# Patient Record
Sex: MALE | Race: WHITE | NOT HISPANIC OR LATINO
[De-identification: names, ages, dates, MRNs, and addresses within clinical notes are randomized per-mention and may not be internally consistent; named-entity substitution may affect disease eponyms.]

---

## 2017-06-29 ENCOUNTER — APPOINTMENT (OUTPATIENT)
Dept: HEART AND VASCULAR | Facility: CLINIC | Age: 77
End: 2017-06-29

## 2017-06-29 VITALS
WEIGHT: 154 LBS | BODY MASS INDEX: 24.75 KG/M2 | SYSTOLIC BLOOD PRESSURE: 136 MMHG | HEIGHT: 66 IN | DIASTOLIC BLOOD PRESSURE: 62 MMHG | HEART RATE: 57 BPM

## 2017-06-30 ENCOUNTER — RESULT REVIEW (OUTPATIENT)
Age: 77
End: 2017-06-30

## 2017-06-30 LAB
ANION GAP SERPL CALC-SCNC: 20 MMOL/L
BUN SERPL-MCNC: 27 MG/DL
CALCIUM SERPL-MCNC: 9.6 MG/DL
CHLORIDE SERPL-SCNC: 105 MMOL/L
CHOLEST SERPL-MCNC: 164 MG/DL
CHOLEST/HDLC SERPL: 3 RATIO
CO2 SERPL-SCNC: 18 MMOL/L
CREAT SERPL-MCNC: 1.06 MG/DL
GLUCOSE SERPL-MCNC: 101 MG/DL
HDLC SERPL-MCNC: 55 MG/DL
LDLC SERPL CALC-MCNC: 90 MG/DL
POTASSIUM SERPL-SCNC: 4 MMOL/L
SODIUM SERPL-SCNC: 143 MMOL/L
TRIGL SERPL-MCNC: 97 MG/DL

## 2017-07-24 ENCOUNTER — APPOINTMENT (OUTPATIENT)
Dept: HEART AND VASCULAR | Facility: CLINIC | Age: 77
End: 2017-07-24

## 2017-07-24 VITALS
WEIGHT: 154 LBS | BODY MASS INDEX: 24.75 KG/M2 | RESPIRATION RATE: 18 BRPM | HEIGHT: 66 IN | SYSTOLIC BLOOD PRESSURE: 108 MMHG | HEART RATE: 60 BPM | DIASTOLIC BLOOD PRESSURE: 56 MMHG

## 2017-12-11 ENCOUNTER — APPOINTMENT (OUTPATIENT)
Dept: HEART AND VASCULAR | Facility: CLINIC | Age: 77
End: 2017-12-11
Payer: MEDICARE

## 2017-12-11 PROCEDURE — 94727 GAS DIL/WSHOT DETER LNG VOL: CPT

## 2017-12-11 PROCEDURE — 94729 DIFFUSING CAPACITY: CPT

## 2017-12-11 PROCEDURE — 94621 CARDIOPULM EXERCISE TESTING: CPT

## 2017-12-11 PROCEDURE — 94010 BREATHING CAPACITY TEST: CPT | Mod: 59

## 2017-12-13 ENCOUNTER — RESULT REVIEW (OUTPATIENT)
Age: 77
End: 2017-12-13

## 2017-12-13 ENCOUNTER — APPOINTMENT (OUTPATIENT)
Dept: HEART AND VASCULAR | Facility: CLINIC | Age: 77
End: 2017-12-13
Payer: MEDICARE

## 2017-12-13 VITALS
BODY MASS INDEX: 25.07 KG/M2 | WEIGHT: 156 LBS | SYSTOLIC BLOOD PRESSURE: 130 MMHG | HEART RATE: 56 BPM | DIASTOLIC BLOOD PRESSURE: 60 MMHG | HEIGHT: 66 IN

## 2017-12-13 PROCEDURE — 93320 DOPPLER ECHO COMPLETE: CPT

## 2017-12-13 PROCEDURE — 93325 DOPPLER ECHO COLOR FLOW MAPG: CPT

## 2017-12-13 PROCEDURE — 93351 STRESS TTE COMPLETE: CPT

## 2017-12-15 ENCOUNTER — RESULT REVIEW (OUTPATIENT)
Age: 77
End: 2017-12-15

## 2018-01-05 ENCOUNTER — APPOINTMENT (OUTPATIENT)
Dept: HEART AND VASCULAR | Facility: CLINIC | Age: 78
End: 2018-01-05
Payer: MEDICARE

## 2018-01-05 VITALS
DIASTOLIC BLOOD PRESSURE: 70 MMHG | RESPIRATION RATE: 18 BRPM | OXYGEN SATURATION: 97 % | HEIGHT: 66 IN | SYSTOLIC BLOOD PRESSURE: 120 MMHG | WEIGHT: 158 LBS | BODY MASS INDEX: 25.39 KG/M2 | HEART RATE: 62 BPM

## 2018-01-05 PROCEDURE — 99214 OFFICE O/P EST MOD 30 MIN: CPT | Mod: 25

## 2018-01-05 PROCEDURE — 36415 COLL VENOUS BLD VENIPUNCTURE: CPT

## 2018-01-05 RX ORDER — VALACYCLOVIR 500 MG/1
500 TABLET, FILM COATED ORAL
Qty: 30 | Refills: 0 | Status: ACTIVE | COMMUNITY
Start: 2017-08-23

## 2018-01-06 LAB
ALBUMIN SERPL ELPH-MCNC: 4.3 G/DL
ALP BLD-CCNC: 65 U/L
ALT SERPL-CCNC: 18 U/L
ANION GAP SERPL CALC-SCNC: 13 MMOL/L
AST SERPL-CCNC: 21 U/L
BILIRUB SERPL-MCNC: 0.4 MG/DL
BUN SERPL-MCNC: 24 MG/DL
CALCIUM SERPL-MCNC: 9.7 MG/DL
CHLORIDE SERPL-SCNC: 103 MMOL/L
CO2 SERPL-SCNC: 24 MMOL/L
CREAT SERPL-MCNC: 1.01 MG/DL
GLUCOSE SERPL-MCNC: 91 MG/DL
POTASSIUM SERPL-SCNC: 4.1 MMOL/L
PROT SERPL-MCNC: 6.2 G/DL
SODIUM SERPL-SCNC: 140 MMOL/L

## 2018-02-05 ENCOUNTER — APPOINTMENT (OUTPATIENT)
Dept: HEART AND VASCULAR | Facility: CLINIC | Age: 78
End: 2018-02-05
Payer: MEDICARE

## 2018-02-05 PROCEDURE — 99214 OFFICE O/P EST MOD 30 MIN: CPT

## 2018-02-05 RX ORDER — METOPROLOL SUCCINATE 50 MG/1
50 TABLET, EXTENDED RELEASE ORAL
Qty: 2 | Refills: 0 | Status: DISCONTINUED | COMMUNITY
Start: 2018-01-05 | End: 2018-02-05

## 2018-06-05 ENCOUNTER — LABORATORY RESULT (OUTPATIENT)
Age: 78
End: 2018-06-05

## 2018-06-05 ENCOUNTER — APPOINTMENT (OUTPATIENT)
Dept: HEART AND VASCULAR | Facility: CLINIC | Age: 78
End: 2018-06-05
Payer: MEDICARE

## 2018-06-05 VITALS
SYSTOLIC BLOOD PRESSURE: 108 MMHG | HEIGHT: 66 IN | WEIGHT: 155 LBS | BODY MASS INDEX: 24.91 KG/M2 | DIASTOLIC BLOOD PRESSURE: 62 MMHG | HEART RATE: 60 BPM | RESPIRATION RATE: 20 BRPM

## 2018-06-05 PROCEDURE — 36415 COLL VENOUS BLD VENIPUNCTURE: CPT

## 2018-06-05 PROCEDURE — 99214 OFFICE O/P EST MOD 30 MIN: CPT | Mod: 25

## 2018-06-05 RX ORDER — ROSUVASTATIN CALCIUM 20 MG/1
20 TABLET, FILM COATED ORAL
Qty: 90 | Refills: 0 | Status: ACTIVE | COMMUNITY
Start: 2017-12-12

## 2018-06-06 LAB
ALBUMIN SERPL ELPH-MCNC: 4.2 G/DL
ALP BLD-CCNC: 59 U/L
ALT SERPL-CCNC: 16 U/L
ANION GAP SERPL CALC-SCNC: 13 MMOL/L
APPEARANCE: CLEAR
AST SERPL-CCNC: 21 U/L
BASOPHILS # BLD AUTO: 0.02 K/UL
BASOPHILS NFR BLD AUTO: 0.3 %
BILIRUB SERPL-MCNC: 0.5 MG/DL
BILIRUBIN URINE: NEGATIVE
BLOOD URINE: NEGATIVE
BUN SERPL-MCNC: 27 MG/DL
CALCIUM SERPL-MCNC: 9.3 MG/DL
CHLORIDE SERPL-SCNC: 106 MMOL/L
CHOLEST SERPL-MCNC: 137 MG/DL
CHOLEST/HDLC SERPL: 3 RATIO
CO2 SERPL-SCNC: 23 MMOL/L
COLOR: YELLOW
CREAT SERPL-MCNC: 0.89 MG/DL
EOSINOPHIL # BLD AUTO: 0.1 K/UL
EOSINOPHIL NFR BLD AUTO: 1.3 %
GLUCOSE QUALITATIVE U: NEGATIVE MG/DL
GLUCOSE SERPL-MCNC: 91 MG/DL
HCT VFR BLD CALC: 44.7 %
HDLC SERPL-MCNC: 46 MG/DL
HGB BLD-MCNC: 14 G/DL
IMM GRANULOCYTES NFR BLD AUTO: 0.3 %
INR PPP: 0.98 RATIO
KETONES URINE: NEGATIVE
LDLC SERPL CALC-MCNC: 73 MG/DL
LEUKOCYTE ESTERASE URINE: NEGATIVE
LYMPHOCYTES # BLD AUTO: 1.19 K/UL
LYMPHOCYTES NFR BLD AUTO: 15.4 %
MAN DIFF?: NORMAL
MCHC RBC-ENTMCNC: 30.4 PG
MCHC RBC-ENTMCNC: 31.3 GM/DL
MCV RBC AUTO: 97 FL
MONOCYTES # BLD AUTO: 0.56 K/UL
MONOCYTES NFR BLD AUTO: 7.3 %
NEUTROPHILS # BLD AUTO: 5.82 K/UL
NEUTROPHILS NFR BLD AUTO: 75.4 %
NITRITE URINE: NEGATIVE
PH URINE: 5
PLATELET # BLD AUTO: 213 K/UL
POTASSIUM SERPL-SCNC: 4.6 MMOL/L
PROT SERPL-MCNC: 6.4 G/DL
PROTEIN URINE: ABNORMAL MG/DL
PT BLD: 11.1 SEC
RBC # BLD: 4.61 M/UL
RBC # FLD: 13.7 %
SODIUM SERPL-SCNC: 142 MMOL/L
SPECIFIC GRAVITY URINE: 1.02
TRIGL SERPL-MCNC: 90 MG/DL
UROBILINOGEN URINE: 1 MG/DL
WBC # FLD AUTO: 7.71 K/UL

## 2018-10-08 ENCOUNTER — APPOINTMENT (OUTPATIENT)
Dept: HEART AND VASCULAR | Facility: CLINIC | Age: 78
End: 2018-10-08
Payer: MEDICARE

## 2018-10-08 PROCEDURE — 36415 COLL VENOUS BLD VENIPUNCTURE: CPT

## 2018-10-09 LAB
ALBUMIN SERPL ELPH-MCNC: 4 G/DL
ALP BLD-CCNC: 57 U/L
ALT SERPL-CCNC: 17 U/L
ANION GAP SERPL CALC-SCNC: 16 MMOL/L
APPEARANCE: CLEAR
AST SERPL-CCNC: 21 U/L
BASOPHILS # BLD AUTO: 0.03 K/UL
BASOPHILS NFR BLD AUTO: 0.5 %
BILIRUB SERPL-MCNC: 0.6 MG/DL
BILIRUBIN URINE: NEGATIVE
BLOOD URINE: NEGATIVE
BUN SERPL-MCNC: 20 MG/DL
CALCIUM SERPL-MCNC: 9.3 MG/DL
CHLORIDE SERPL-SCNC: 108 MMOL/L
CHOLEST SERPL-MCNC: 145 MG/DL
CHOLEST/HDLC SERPL: 2.7 RATIO
CO2 SERPL-SCNC: 23 MMOL/L
COLOR: YELLOW
CREAT SERPL-MCNC: 0.94 MG/DL
EOSINOPHIL # BLD AUTO: 0.14 K/UL
EOSINOPHIL NFR BLD AUTO: 2.3 %
GLUCOSE QUALITATIVE U: NEGATIVE MG/DL
GLUCOSE SERPL-MCNC: 68 MG/DL
HCT VFR BLD CALC: 47.3 %
HDLC SERPL-MCNC: 53 MG/DL
HGB BLD-MCNC: 14.3 G/DL
IMM GRANULOCYTES NFR BLD AUTO: 0.3 %
INR PPP: 0.95 RATIO
KETONES URINE: NEGATIVE
LDLC SERPL CALC-MCNC: 73 MG/DL
LEUKOCYTE ESTERASE URINE: NEGATIVE
LYMPHOCYTES # BLD AUTO: 1.11 K/UL
LYMPHOCYTES NFR BLD AUTO: 18 %
MAN DIFF?: NORMAL
MCHC RBC-ENTMCNC: 30.1 PG
MCHC RBC-ENTMCNC: 30.2 GM/DL
MCV RBC AUTO: 99.6 FL
MONOCYTES # BLD AUTO: 0.41 K/UL
MONOCYTES NFR BLD AUTO: 6.6 %
NEUTROPHILS # BLD AUTO: 4.46 K/UL
NEUTROPHILS NFR BLD AUTO: 72.3 %
NITRITE URINE: NEGATIVE
PH URINE: 6
PLATELET # BLD AUTO: 202 K/UL
POTASSIUM SERPL-SCNC: 4 MMOL/L
PROT SERPL-MCNC: 6.4 G/DL
PROTEIN URINE: NEGATIVE MG/DL
PT BLD: 10.7 SEC
RBC # BLD: 4.75 M/UL
RBC # FLD: 14.1 %
SODIUM SERPL-SCNC: 147 MMOL/L
SPECIFIC GRAVITY URINE: 1.01
TRIGL SERPL-MCNC: 95 MG/DL
UROBILINOGEN URINE: NEGATIVE MG/DL
WBC # FLD AUTO: 6.17 K/UL

## 2018-12-20 ENCOUNTER — APPOINTMENT (OUTPATIENT)
Dept: HEART AND VASCULAR | Facility: CLINIC | Age: 78
End: 2018-12-20
Payer: MEDICARE

## 2018-12-20 VITALS
HEIGHT: 66 IN | WEIGHT: 156 LBS | BODY MASS INDEX: 25.07 KG/M2 | DIASTOLIC BLOOD PRESSURE: 72 MMHG | HEART RATE: 60 BPM | RESPIRATION RATE: 20 BRPM | SYSTOLIC BLOOD PRESSURE: 144 MMHG

## 2018-12-20 PROCEDURE — 99214 OFFICE O/P EST MOD 30 MIN: CPT

## 2018-12-20 RX ORDER — LEVOTHYROXINE SODIUM 0.3 MG/1
300 TABLET ORAL
Qty: 90 | Refills: 0 | Status: DISCONTINUED | COMMUNITY
Start: 2018-06-22

## 2018-12-20 RX ORDER — FUROSEMIDE 20 MG/1
20 TABLET ORAL
Qty: 30 | Refills: 0 | Status: DISCONTINUED | COMMUNITY
Start: 2018-10-15

## 2018-12-21 NOTE — DISCUSSION/SUMMARY
[Cardiomyopathy] : cardiomyopathy [Coronary Artery Disease] : coronary artery disease [Anginal Equivalent] : anginal equivalent [Possible Cardiac Ischemia (Intermd Prob)] : possible cardiac ischemia (intermediate probability) [Medication Changes Per Orders] : as documented in orders [Coronary Artery Catheterization] : coronary artery catheterization [Possible Stent Placement] : possible stent placement [Dietary Modification] : dietary modification [Hyperlipidemia] : hyperlipidemia [Diet Modification] : diet modification [Exercise] : exercise [Hypertension] : hypertension [Stable] : stable [Exercise Regimen] : an exercise regimen [Mild Mitral Regurgitation] : mild mitral regurgitation [Compensated] : compensated [None] : none [Sodium Restriction] : sodium restriction [Patient] : the patient [de-identified] : patent LAD stent; 50 LAD mid with 0.84 FFR [FreeTextEntry1] : AI/TR - stable

## 2018-12-21 NOTE — REASON FOR VISIT
[Follow-Up - Clinic] : a clinic follow-up of [Cardiomyopathy] : cardiomyopathy [Coronary Artery Disease] : coronary artery disease [Hyperlipidemia] : hyperlipidemia [Hypertension] : hypertension [Mitral Regurgitation] : mitral regurgitation [FreeTextEntry1] : AI/TR

## 2018-12-21 NOTE — HISTORY OF PRESENT ILLNESS
[FreeTextEntry1] : Dhaval Barriga returns for follow up.  He denies cp, sob at rest, pnd, orthopnea, edema, palp, or loc.\par \par He still feels like he wheezes when he exhales.  He is compliant with exercise and meds.\par \par Cardiac Cath 10/2018: ANSELMO CX/RCA; patent LAD stent; 50% mid LAD lesion with 0.84 FFR.\par \par Recovery has been unremarkable.  \par \par Results reviewed.  Will start L arginine and ranolazine 500 mg daily.\par \par Follow up in 4 weeks.

## 2018-12-21 NOTE — REVIEW OF SYSTEMS
[Cough] : no cough [Wheezing] : wheezing [Coughing Up Blood] : no hemoptysis [Joint Pain] : joint pain [Joint Swelling] : no joint swelling [Joint Stiffness] : joint stiffness [Muscle Cramps] : no muscle cramps [Limb Weakness (Paresis)] : no limb weakness [Skin: A Rash] : no rash: [Skin Lesions] : no skin lesions [Negative] : Heme/Lymph

## 2018-12-21 NOTE — PHYSICAL EXAM
[General Appearance - Well Developed] : well developed [Normal Appearance] : normal appearance [Well Groomed] : well groomed [General Appearance - Well Nourished] : well nourished [No Deformities] : no deformities [General Appearance - In No Acute Distress] : no acute distress [Normal Jugular Venous A Waves Present] : normal jugular venous A waves present [Normal Jugular Venous V Waves Present] : normal jugular venous V waves present [No Jugular Venous Jaramillo A Waves] : no jugular venous jaramillo A waves [Respiration, Rhythm And Depth] : normal respiratory rhythm and effort [Exaggerated Use Of Accessory Muscles For Inspiration] : no accessory muscle use [Auscultation Breath Sounds / Voice Sounds] : lungs were clear to auscultation bilaterally [Heart Rate And Rhythm] : heart rate and rhythm were normal [Heart Sounds] : normal S1 and S2 [Murmurs] : no murmurs present [Abdomen Soft] : soft [Abdomen Tenderness] : non-tender [Abdomen Mass (___ Cm)] : no abdominal mass palpated [Nail Clubbing] : no clubbing of the fingernails [Cyanosis, Localized] : no localized cyanosis [Petechial Hemorrhages (___cm)] : no petechial hemorrhages [] : no ischemic changes

## 2019-01-04 ENCOUNTER — RX RENEWAL (OUTPATIENT)
Age: 79
End: 2019-01-04

## 2019-01-24 ENCOUNTER — APPOINTMENT (OUTPATIENT)
Dept: HEART AND VASCULAR | Facility: CLINIC | Age: 79
End: 2019-01-24
Payer: MEDICARE

## 2019-01-24 VITALS
RESPIRATION RATE: 14 BRPM | DIASTOLIC BLOOD PRESSURE: 78 MMHG | BODY MASS INDEX: 25.07 KG/M2 | HEIGHT: 66 IN | SYSTOLIC BLOOD PRESSURE: 116 MMHG | HEART RATE: 62 BPM | WEIGHT: 156 LBS

## 2019-01-24 PROCEDURE — 99214 OFFICE O/P EST MOD 30 MIN: CPT

## 2019-01-24 NOTE — PHYSICAL EXAM
[General Appearance - Well Developed] : well developed [Normal Appearance] : normal appearance [Well Groomed] : well groomed [General Appearance - Well Nourished] : well nourished [No Deformities] : no deformities [General Appearance - In No Acute Distress] : no acute distress [Normal Conjunctiva] : the conjunctiva exhibited no abnormalities [Eyelids - No Xanthelasma] : the eyelids demonstrated no xanthelasmas [Normal Oral Mucosa] : normal oral mucosa [No Oral Pallor] : no oral pallor [No Oral Cyanosis] : no oral cyanosis [Normal Jugular Venous A Waves Present] : normal jugular venous A waves present [Normal Jugular Venous V Waves Present] : normal jugular venous V waves present [No Jugular Venous Jaramillo A Waves] : no jugular venous jaramillo A waves [Respiration, Rhythm And Depth] : normal respiratory rhythm and effort [Exaggerated Use Of Accessory Muscles For Inspiration] : no accessory muscle use [FreeTextEntry1] : wheeze with forced expiration [Heart Rate And Rhythm] : heart rate and rhythm were normal [Heart Sounds] : normal S1 and S2 [Murmurs] : no murmurs present [Abdomen Soft] : soft [Abdomen Tenderness] : non-tender [Abdomen Mass (___ Cm)] : no abdominal mass palpated [Abnormal Walk] : normal gait [Gait - Sufficient For Exercise Testing] : the gait was sufficient for exercise testing [Nail Clubbing] : no clubbing of the fingernails [Cyanosis, Localized] : no localized cyanosis [Petechial Hemorrhages (___cm)] : no petechial hemorrhages [Skin Color & Pigmentation] : normal skin color and pigmentation [] : no rash [No Venous Stasis] : no venous stasis [Skin Lesions] : no skin lesions [No Skin Ulcers] : no skin ulcer [No Xanthoma] : no  xanthoma was observed

## 2019-01-24 NOTE — HISTORY OF PRESENT ILLNESS
[FreeTextEntry1] : Dhaval Steward returns for follow up.  He denies cp, sob, pnd, orthopnea, edema, palp, or loc.\par \par He is active and exercises 2 to 3 times/week.  He is compliant with meds \par \par He continues to have expiratory wheeze.  He did not appreciate any major change in his functional capacity with ranolazine.  \par \par Will add L arginine.  Will hold ramipril for 1 week.

## 2019-02-01 ENCOUNTER — RESULT REVIEW (OUTPATIENT)
Age: 79
End: 2019-02-01

## 2019-02-01 ENCOUNTER — RX RENEWAL (OUTPATIENT)
Age: 79
End: 2019-02-01

## 2019-02-03 ENCOUNTER — RX RENEWAL (OUTPATIENT)
Age: 79
End: 2019-02-03

## 2019-03-25 ENCOUNTER — APPOINTMENT (OUTPATIENT)
Dept: HEART AND VASCULAR | Facility: CLINIC | Age: 79
End: 2019-03-25
Payer: MEDICARE

## 2019-03-25 PROCEDURE — 94621 CARDIOPULM EXERCISE TESTING: CPT

## 2019-03-25 PROCEDURE — 94010 BREATHING CAPACITY TEST: CPT | Mod: 59

## 2019-03-25 PROCEDURE — 94729 DIFFUSING CAPACITY: CPT

## 2019-03-25 PROCEDURE — 94727 GAS DIL/WSHOT DETER LNG VOL: CPT

## 2019-04-22 ENCOUNTER — APPOINTMENT (OUTPATIENT)
Dept: HEART AND VASCULAR | Facility: CLINIC | Age: 79
End: 2019-04-22
Payer: MEDICARE

## 2019-04-22 VITALS
DIASTOLIC BLOOD PRESSURE: 70 MMHG | SYSTOLIC BLOOD PRESSURE: 138 MMHG | WEIGHT: 158 LBS | RESPIRATION RATE: 16 BRPM | BODY MASS INDEX: 25.39 KG/M2 | HEIGHT: 66 IN | HEART RATE: 56 BPM

## 2019-04-22 PROCEDURE — 99214 OFFICE O/P EST MOD 30 MIN: CPT

## 2019-04-22 RX ORDER — AZITHROMYCIN 250 MG/1
250 TABLET, FILM COATED ORAL
Qty: 6 | Refills: 0 | Status: DISCONTINUED | COMMUNITY
Start: 2019-01-15

## 2019-04-22 RX ORDER — FLUTICASONE PROPIONATE AND SALMETEROL 50; 250 UG/1; UG/1
250-50 POWDER RESPIRATORY (INHALATION)
Qty: 60 | Refills: 0 | Status: DISCONTINUED | COMMUNITY
Start: 2017-10-31 | End: 2019-04-22

## 2019-04-22 RX ORDER — LORATADINE/PSEUDOEPHEDRINE 10MG-240MG
10-240 TABLET, EXTENDED RELEASE 24 HR ORAL
Qty: 15 | Refills: 0 | Status: DISCONTINUED | COMMUNITY
Start: 2019-01-15

## 2019-04-22 RX ORDER — PREDNISONE 20 MG/1
20 TABLET ORAL
Qty: 8 | Refills: 0 | Status: DISCONTINUED | COMMUNITY
Start: 2019-01-15

## 2019-04-22 RX ORDER — GABAPENTIN 300 MG/1
300 CAPSULE ORAL DAILY
Qty: 7 | Refills: 0 | Status: DISCONTINUED | COMMUNITY
Start: 2017-11-01 | End: 2019-04-22

## 2019-04-22 RX ORDER — RANOLAZINE 500 MG/1
500 TABLET, FILM COATED, EXTENDED RELEASE ORAL
Qty: 30 | Refills: 0 | Status: DISCONTINUED | COMMUNITY
Start: 2019-01-04 | End: 2019-04-22

## 2019-04-22 RX ORDER — ALBUTEROL SULFATE 90 UG/1
108 (90 BASE) AEROSOL, METERED RESPIRATORY (INHALATION)
Qty: 9 | Refills: 0 | Status: DISCONTINUED | COMMUNITY
Start: 2017-09-12 | End: 2019-04-22

## 2019-04-22 NOTE — PHYSICAL EXAM
[General Appearance - Well Developed] : well developed [Normal Appearance] : normal appearance [Well Groomed] : well groomed [General Appearance - Well Nourished] : well nourished [General Appearance - In No Acute Distress] : no acute distress [No Deformities] : no deformities [Normal Conjunctiva] : the conjunctiva exhibited no abnormalities [Eyelids - No Xanthelasma] : the eyelids demonstrated no xanthelasmas [Normal Oral Mucosa] : normal oral mucosa [No Oral Pallor] : no oral pallor [Normal Jugular Venous A Waves Present] : normal jugular venous A waves present [No Oral Cyanosis] : no oral cyanosis [Normal Jugular Venous V Waves Present] : normal jugular venous V waves present [No Jugular Venous Jaramillo A Waves] : no jugular venous jaramillo A waves [Respiration, Rhythm And Depth] : normal respiratory rhythm and effort [Exaggerated Use Of Accessory Muscles For Inspiration] : no accessory muscle use [FreeTextEntry1] : wheeze with forced expiration [Heart Rate And Rhythm] : heart rate and rhythm were normal [Heart Sounds] : normal S1 and S2 [Abdomen Soft] : soft [Murmurs] : no murmurs present [Abdomen Tenderness] : non-tender [Abnormal Walk] : normal gait [Abdomen Mass (___ Cm)] : no abdominal mass palpated [Gait - Sufficient For Exercise Testing] : the gait was sufficient for exercise testing [Nail Clubbing] : no clubbing of the fingernails [Petechial Hemorrhages (___cm)] : no petechial hemorrhages [Cyanosis, Localized] : no localized cyanosis [Skin Color & Pigmentation] : normal skin color and pigmentation [] : no rash [No Venous Stasis] : no venous stasis [Skin Lesions] : no skin lesions [No Skin Ulcers] : no skin ulcer [No Xanthoma] : no  xanthoma was observed

## 2019-04-22 NOTE — REASON FOR VISIT
[Follow-Up - Clinic] : a clinic follow-up of [Cardiomyopathy] : cardiomyopathy [Coronary Artery Disease] : coronary artery disease [Hyperlipidemia] : hyperlipidemia [Hypertension] : hypertension [Mitral Regurgitation] : mitral regurgitation [FreeTextEntry1] : TR/AI

## 2019-04-22 NOTE — DISCUSSION/SUMMARY
[Cardiomyopathy] : cardiomyopathy [Coronary Artery Disease] : coronary artery disease [Possible Cardiac Ischemia (Intermd Prob)] : possible cardiac ischemia (intermediate probability) [Anginal Equivalent] : anginal equivalent [Medication Changes Per Orders] : as documented in orders [Dietary Modification] : dietary modification [Coronary Artery Catheterization] : coronary artery catheterization [Possible Stent Placement] : possible stent placement [Hyperlipidemia] : hyperlipidemia [Diet Modification] : diet modification [Exercise] : exercise [Hypertension] : hypertension [Stable] : stable [Exercise Regimen] : an exercise regimen [Mild Mitral Regurgitation] : mild mitral regurgitation [Compensated] : compensated [Sodium Restriction] : sodium restriction [None] : none [Patient] : the patient [FreeTextEntry1] : AI/TR - stable [de-identified] : patent LAD stent; 50 LAD mid with 0.84 FFR

## 2019-04-22 NOTE — HISTORY OF PRESENT ILLNESS
[FreeTextEntry1] : Dhaval Steward returns for follow up.  He denies cp, sob, pnd, orthopnea, edema, palp, or loc.\par \par He is active but not exercising as much as he has in the past.  He is compliant with meds.\par \par He is tolerating amlodipine much better than ramipril.  He has some wheezing with amlodipine.\par \par CPET 3/2019: ischemic myocardial dysfxn; 91% predicted peak VO2\par \par Reviewed results in detail.  Increase exercise.

## 2019-04-26 ENCOUNTER — RX RENEWAL (OUTPATIENT)
Age: 79
End: 2019-04-26

## 2019-10-14 ENCOUNTER — APPOINTMENT (OUTPATIENT)
Dept: HEART AND VASCULAR | Facility: CLINIC | Age: 79
End: 2019-10-14
Payer: MEDICARE

## 2019-10-14 VITALS
RESPIRATION RATE: 16 BRPM | HEART RATE: 60 BPM | SYSTOLIC BLOOD PRESSURE: 120 MMHG | BODY MASS INDEX: 25.23 KG/M2 | DIASTOLIC BLOOD PRESSURE: 60 MMHG | WEIGHT: 157 LBS | HEIGHT: 66 IN

## 2019-10-14 PROCEDURE — 99214 OFFICE O/P EST MOD 30 MIN: CPT

## 2019-10-14 NOTE — PHYSICAL EXAM
[General Appearance - Well Developed] : well developed [Well Groomed] : well groomed [Normal Appearance] : normal appearance [No Deformities] : no deformities [General Appearance - Well Nourished] : well nourished [Normal Conjunctiva] : the conjunctiva exhibited no abnormalities [General Appearance - In No Acute Distress] : no acute distress [Normal Oral Mucosa] : normal oral mucosa [Eyelids - No Xanthelasma] : the eyelids demonstrated no xanthelasmas [No Oral Cyanosis] : no oral cyanosis [No Oral Pallor] : no oral pallor [Normal Jugular Venous A Waves Present] : normal jugular venous A waves present [Normal Jugular Venous V Waves Present] : normal jugular venous V waves present [No Jugular Venous Jaramillo A Waves] : no jugular venous jaramillo A waves [Respiration, Rhythm And Depth] : normal respiratory rhythm and effort [Exaggerated Use Of Accessory Muscles For Inspiration] : no accessory muscle use [Heart Rate And Rhythm] : heart rate and rhythm were normal [FreeTextEntry1] : wheeze with forced expiration [Heart Sounds] : normal S1 and S2 [Murmurs] : no murmurs present [Abdomen Soft] : soft [Abdomen Tenderness] : non-tender [Abdomen Mass (___ Cm)] : no abdominal mass palpated [Gait - Sufficient For Exercise Testing] : the gait was sufficient for exercise testing [Abnormal Walk] : normal gait [Cyanosis, Localized] : no localized cyanosis [Nail Clubbing] : no clubbing of the fingernails [Petechial Hemorrhages (___cm)] : no petechial hemorrhages [Skin Color & Pigmentation] : normal skin color and pigmentation [] : no rash [No Venous Stasis] : no venous stasis [Skin Lesions] : no skin lesions [No Skin Ulcers] : no skin ulcer [No Xanthoma] : no  xanthoma was observed

## 2019-10-14 NOTE — REVIEW OF SYSTEMS
[Cough] : no cough [Wheezing] : wheezing [Joint Pain] : joint pain [Coughing Up Blood] : no hemoptysis [Joint Swelling] : no joint swelling [Joint Stiffness] : joint stiffness [Muscle Cramps] : no muscle cramps [Skin Lesions] : no skin lesions [Skin: A Rash] : no rash: [Limb Weakness (Paresis)] : no limb weakness [Negative] : Heme/Lymph

## 2019-10-14 NOTE — DISCUSSION/SUMMARY
[Cardiomyopathy] : cardiomyopathy [Anginal Equivalent] : anginal equivalent [Possible Cardiac Ischemia (Intermd Prob)] : possible cardiac ischemia (intermediate probability) [Coronary Artery Disease] : coronary artery disease [Medication Changes Per Orders] : as documented in orders [Coronary Artery Catheterization] : coronary artery catheterization [Possible Stent Placement] : possible stent placement [Dietary Modification] : dietary modification [Hyperlipidemia] : hyperlipidemia [Diet Modification] : diet modification [Exercise] : exercise [Hypertension] : hypertension [Stable] : stable [Exercise Regimen] : an exercise regimen [Mild Mitral Regurgitation] : mild mitral regurgitation [Sodium Restriction] : sodium restriction [Compensated] : compensated [None] : none [Patient] : the patient [de-identified] : patent LAD stent; 50 LAD mid with 0.84 FFR [FreeTextEntry1] : AI/TR - stable

## 2019-10-14 NOTE — HISTORY OF PRESENT ILLNESS
[FreeTextEntry1] : Dhaval Steward returns for follow up.  He denies cp, sob, pnd, orthopnea, edema, palp, or loc.\par \par He is active.  He is compliant with meds.\par \par He is tolerating amlodipine much better than ramipril.  He has some wheezing with amlodipine.  He is using Advair.\par \par

## 2019-11-19 ENCOUNTER — APPOINTMENT (OUTPATIENT)
Dept: HEART AND VASCULAR | Facility: CLINIC | Age: 79
End: 2019-11-19
Payer: MEDICARE

## 2019-11-19 PROCEDURE — 94010 BREATHING CAPACITY TEST: CPT | Mod: 59

## 2019-11-19 PROCEDURE — 94727 GAS DIL/WSHOT DETER LNG VOL: CPT

## 2019-11-19 PROCEDURE — 94729 DIFFUSING CAPACITY: CPT

## 2019-11-19 PROCEDURE — 94621 CARDIOPULM EXERCISE TESTING: CPT

## 2020-01-03 ENCOUNTER — APPOINTMENT (OUTPATIENT)
Dept: HEART AND VASCULAR | Facility: CLINIC | Age: 80
End: 2020-01-03
Payer: MEDICARE

## 2020-01-03 VITALS
BODY MASS INDEX: 25.07 KG/M2 | SYSTOLIC BLOOD PRESSURE: 142 MMHG | RESPIRATION RATE: 16 BRPM | HEIGHT: 66 IN | WEIGHT: 156 LBS | DIASTOLIC BLOOD PRESSURE: 68 MMHG | HEART RATE: 64 BPM

## 2020-01-03 PROCEDURE — 99215 OFFICE O/P EST HI 40 MIN: CPT

## 2020-01-04 LAB
CHOLEST SERPL-MCNC: 134 MG/DL
CHOLEST/HDLC SERPL: 2.9 RATIO
HDLC SERPL-MCNC: 46 MG/DL
LDLC SERPL CALC-MCNC: 65 MG/DL
TRIGL SERPL-MCNC: 114 MG/DL

## 2020-01-04 NOTE — PHYSICAL EXAM
[General Appearance - Well Developed] : well developed [Normal Appearance] : normal appearance [Well Groomed] : well groomed [General Appearance - In No Acute Distress] : no acute distress [No Deformities] : no deformities [General Appearance - Well Nourished] : well nourished [Normal Oral Mucosa] : normal oral mucosa [Normal Conjunctiva] : the conjunctiva exhibited no abnormalities [Eyelids - No Xanthelasma] : the eyelids demonstrated no xanthelasmas [No Oral Cyanosis] : no oral cyanosis [No Oral Pallor] : no oral pallor [Normal Jugular Venous A Waves Present] : normal jugular venous A waves present [No Jugular Venous Jaramillo A Waves] : no jugular venous jaramillo A waves [Normal Jugular Venous V Waves Present] : normal jugular venous V waves present [Respiration, Rhythm And Depth] : normal respiratory rhythm and effort [Heart Sounds] : normal S1 and S2 [Auscultation Breath Sounds / Voice Sounds] : lungs were clear to auscultation bilaterally [Exaggerated Use Of Accessory Muscles For Inspiration] : no accessory muscle use [Heart Rate And Rhythm] : heart rate and rhythm were normal [Abdomen Soft] : soft [Murmurs] : no murmurs present [Abdomen Tenderness] : non-tender [Abdomen Mass (___ Cm)] : no abdominal mass palpated [Abnormal Walk] : normal gait [Nail Clubbing] : no clubbing of the fingernails [Gait - Sufficient For Exercise Testing] : the gait was sufficient for exercise testing [Cyanosis, Localized] : no localized cyanosis [Skin Color & Pigmentation] : normal skin color and pigmentation [Petechial Hemorrhages (___cm)] : no petechial hemorrhages [No Venous Stasis] : no venous stasis [Skin Lesions] : no skin lesions [] : no rash [No Skin Ulcers] : no skin ulcer [No Xanthoma] : no  xanthoma was observed

## 2020-01-04 NOTE — REVIEW OF SYSTEMS
[Cough] : cough [Coughing Up Blood] : no hemoptysis [Joint Pain] : joint pain [Wheezing] : no wheezing [Muscle Cramps] : no muscle cramps [Joint Swelling] : no joint swelling [Joint Stiffness] : joint stiffness [Skin: A Rash] : no rash: [Limb Weakness (Paresis)] : no limb weakness [Skin Lesions] : no skin lesions [Negative] : Endocrine

## 2020-01-04 NOTE — REASON FOR VISIT
[Follow-Up - Clinic] : a clinic follow-up of [Hyperlipidemia] : hyperlipidemia [Cardiomyopathy] : cardiomyopathy [Coronary Artery Disease] : coronary artery disease [Hypertension] : hypertension [Mitral Regurgitation] : mitral regurgitation [FreeTextEntry1] : TR/AI

## 2020-01-04 NOTE — HISTORY OF PRESENT ILLNESS
[FreeTextEntry1] : Dhaval Steward returns for follow up.  He denies cp, sob, pnd, orthopnea, edema, palp, or loc.\par \par He has been treated for URI and has not been able to exercise over the last few weeks. He is resuming exercise now.  He is compliant with meds.\par \par CPET 11/2019: (dec RER); 102% predicted peak VO2; nl; slight dec in FEV1/VC ratio\par \par Reviewed results in detail.  He admits to dec effort due to concern that he would have CV event.\par \par He is tolerating amlodipine much better than ramipril.  He is using Advair.\par \par

## 2020-01-04 NOTE — DISCUSSION/SUMMARY
[Cardiomyopathy] : cardiomyopathy [Anginal Equivalent] : anginal equivalent [Coronary Artery Disease] : coronary artery disease [Possible Cardiac Ischemia (Intermd Prob)] : possible cardiac ischemia (intermediate probability) [Improving] : improving [Coronary Artery Catheterization] : coronary artery catheterization [Dietary Modification] : dietary modification [Possible Stent Placement] : possible stent placement [Hyperlipidemia] : hyperlipidemia [Diet Modification] : diet modification [Exercise] : exercise [Hypertension] : hypertension [Stable] : stable [Exercise Regimen] : an exercise regimen [Mild Mitral Regurgitation] : mild mitral regurgitation [Sodium Restriction] : sodium restriction [None] : none [Compensated] : compensated [de-identified] : diastolic dysfunction [Patient] : the patient [FreeTextEntry1] : AI/TR - stable

## 2020-01-07 ENCOUNTER — RESULT REVIEW (OUTPATIENT)
Age: 80
End: 2020-01-07

## 2020-04-17 ENCOUNTER — RX RENEWAL (OUTPATIENT)
Age: 80
End: 2020-04-17

## 2020-07-14 ENCOUNTER — RX RENEWAL (OUTPATIENT)
Age: 80
End: 2020-07-14

## 2020-08-25 ENCOUNTER — APPOINTMENT (OUTPATIENT)
Dept: HEART AND VASCULAR | Facility: CLINIC | Age: 80
End: 2020-08-25
Payer: MEDICARE

## 2020-08-25 VITALS
HEART RATE: 64 BPM | BODY MASS INDEX: 24.91 KG/M2 | RESPIRATION RATE: 16 BRPM | TEMPERATURE: 97.9 F | DIASTOLIC BLOOD PRESSURE: 75 MMHG | HEIGHT: 66 IN | OXYGEN SATURATION: 95 % | WEIGHT: 155 LBS | SYSTOLIC BLOOD PRESSURE: 135 MMHG

## 2020-08-25 PROCEDURE — 99215 OFFICE O/P EST HI 40 MIN: CPT

## 2020-08-25 RX ORDER — FLUTICASONE PROPIONATE AND SALMETEROL 50; 250 UG/1; UG/1
250-50 POWDER RESPIRATORY (INHALATION)
Refills: 0 | Status: DISCONTINUED | COMMUNITY
End: 2020-08-25

## 2020-08-25 NOTE — PHYSICAL EXAM
[Normal Appearance] : normal appearance [General Appearance - Well Developed] : well developed [No Deformities] : no deformities [General Appearance - Well Nourished] : well nourished [Well Groomed] : well groomed [Normal Conjunctiva] : the conjunctiva exhibited no abnormalities [General Appearance - In No Acute Distress] : no acute distress [Eyelids - No Xanthelasma] : the eyelids demonstrated no xanthelasmas [Normal Oral Mucosa] : normal oral mucosa [No Oral Pallor] : no oral pallor [No Oral Cyanosis] : no oral cyanosis [Normal Jugular Venous A Waves Present] : normal jugular venous A waves present [Normal Jugular Venous V Waves Present] : normal jugular venous V waves present [No Jugular Venous Jaramillo A Waves] : no jugular venous jaramillo A waves [Respiration, Rhythm And Depth] : normal respiratory rhythm and effort [Exaggerated Use Of Accessory Muscles For Inspiration] : no accessory muscle use [Auscultation Breath Sounds / Voice Sounds] : lungs were clear to auscultation bilaterally [Heart Rate And Rhythm] : heart rate and rhythm were normal [Heart Sounds] : normal S1 and S2 [Murmurs] : no murmurs present [Abdomen Soft] : soft [Abdomen Tenderness] : non-tender [Abdomen Mass (___ Cm)] : no abdominal mass palpated [Abnormal Walk] : normal gait [Gait - Sufficient For Exercise Testing] : the gait was sufficient for exercise testing [Nail Clubbing] : no clubbing of the fingernails [Petechial Hemorrhages (___cm)] : no petechial hemorrhages [Cyanosis, Localized] : no localized cyanosis [Skin Color & Pigmentation] : normal skin color and pigmentation [] : no rash [No Venous Stasis] : no venous stasis [Skin Lesions] : no skin lesions [No Skin Ulcers] : no skin ulcer [No Xanthoma] : no  xanthoma was observed

## 2020-08-25 NOTE — REASON FOR VISIT
[Follow-Up - Clinic] : a clinic follow-up of [Cardiomyopathy] : cardiomyopathy [Hyperlipidemia] : hyperlipidemia [Hypertension] : hypertension [Coronary Artery Disease] : coronary artery disease [Mitral Regurgitation] : mitral regurgitation [FreeTextEntry1] : TR/AI

## 2020-08-25 NOTE — REVIEW OF SYSTEMS
[Cough] : no cough [Wheezing] : no wheezing [Joint Pain] : joint pain [Coughing Up Blood] : no hemoptysis [Joint Swelling] : no joint swelling [Joint Stiffness] : joint stiffness [Muscle Cramps] : no muscle cramps [Skin: A Rash] : no rash: [Limb Weakness (Paresis)] : no limb weakness [Skin Lesions] : no skin lesions [Negative] : Heme/Lymph

## 2020-08-25 NOTE — DISCUSSION/SUMMARY
[Cardiomyopathy] : cardiomyopathy [Coronary Artery Disease] : coronary artery disease [Anginal Equivalent] : anginal equivalent [Improving] : improving [Possible Cardiac Ischemia (Intermd Prob)] : possible cardiac ischemia (intermediate probability) [Coronary Artery Catheterization] : coronary artery catheterization [Possible Stent Placement] : possible stent placement [Dietary Modification] : dietary modification [Hyperlipidemia] : hyperlipidemia [Exercise] : exercise [Diet Modification] : diet modification [Hypertension] : hypertension [Stable] : stable [Exercise Regimen] : an exercise regimen [Mild Mitral Regurgitation] : mild mitral regurgitation [Compensated] : compensated [Patient] : the patient [Sodium Restriction] : sodium restriction [None] : none [de-identified] : patent LAD stent; 50 LAD mid with 0.84 FFR [FreeTextEntry1] : AI/TR - stable

## 2020-10-13 ENCOUNTER — APPOINTMENT (OUTPATIENT)
Dept: SURGERY | Facility: CLINIC | Age: 80
End: 2020-10-13
Payer: MEDICARE

## 2020-10-13 ENCOUNTER — RX RENEWAL (OUTPATIENT)
Age: 80
End: 2020-10-13

## 2020-10-13 VITALS
BODY MASS INDEX: 25.07 KG/M2 | WEIGHT: 156 LBS | SYSTOLIC BLOOD PRESSURE: 98 MMHG | HEIGHT: 66 IN | HEART RATE: 55 BPM | DIASTOLIC BLOOD PRESSURE: 67 MMHG | TEMPERATURE: 98.5 F

## 2020-10-13 DIAGNOSIS — K40.90 UNILATERAL INGUINAL HERNIA, W/OUT OBSTRUCTION OR GANGRENE, NOT SPECIFIED AS RECURRENT: ICD-10-CM

## 2020-10-13 PROCEDURE — 99204 OFFICE O/P NEW MOD 45 MIN: CPT

## 2020-10-13 NOTE — CONSULT LETTER
[Dear  ___] : Dear  [unfilled], [Consult Letter:] : I had the pleasure of evaluating your patient, [unfilled]. [Please see my note below.] : Please see my note below. [DrNegro  ___] : Dr. FIELDS

## 2020-10-13 NOTE — PLAN
[FreeTextEntry1] : He will schedule the surgery and see Dr Barker for PST and undergo Covid testing at [facility] prior

## 2020-10-13 NOTE — ASSESSMENT
[FreeTextEntry1] : left inguinal hernia, reducible slightly tender, discussed options, robotic (lap) LIHR with mesh poss right poss open discussed. The risks benefits and alternatives were discussed and the patient agrees to the described plan.\par

## 2020-10-13 NOTE — PHYSICAL EXAM
[Normal Breath Sounds] : Normal breath sounds [Normal Heart Sounds] : normal heart sounds [Abdominal Masses] : No abdominal masses [Abdomen Tenderness] : ~T ~M Abdominal tenderness [Alert] : alert [Oriented to Person] : oriented to person [Oriented to Place] : oriented to place [Oriented to Time] : oriented to time [de-identified] : NAD [de-identified] : left inguinal hernia, bulge palpated

## 2020-10-13 NOTE — HISTORY OF PRESENT ILLNESS
[de-identified] : The patient presents for evaluation of a progressive left inguinal pain noted especially after movement or at the end of the day. He does not feel a bulge. He is very active (, runs). Has h/o Waldenstrom macroglobulinemia.

## 2020-10-23 ENCOUNTER — APPOINTMENT (OUTPATIENT)
Dept: SURGERY | Facility: HOSPITAL | Age: 80
End: 2020-10-23

## 2020-11-10 ENCOUNTER — APPOINTMENT (OUTPATIENT)
Dept: SURGERY | Facility: CLINIC | Age: 80
End: 2020-11-10
Payer: MEDICARE

## 2020-11-10 VITALS
HEIGHT: 66 IN | BODY MASS INDEX: 25.07 KG/M2 | WEIGHT: 156 LBS | SYSTOLIC BLOOD PRESSURE: 154 MMHG | DIASTOLIC BLOOD PRESSURE: 73 MMHG | TEMPERATURE: 98.3 F | HEART RATE: 60 BPM

## 2020-11-10 DIAGNOSIS — Z09 ENCOUNTER FOR FOLLOW-UP EXAMINATION AFTER COMPLETED TREATMENT FOR CONDITIONS OTHER THAN MALIGNANT NEOPLASM: ICD-10-CM

## 2020-11-10 PROCEDURE — 99024 POSTOP FOLLOW-UP VISIT: CPT

## 2020-11-10 NOTE — ASSESSMENT
[FreeTextEntry1] : Patient here for post op check. Doing very well. No complaints. Wounds clean and dry. Tolerating po and no GI complaints. To follow up PRN.\par

## 2021-01-01 DIAGNOSIS — Z20.822 CONTACT WITH AND (SUSPECTED) EXPOSURE TO COVID-19: ICD-10-CM

## 2021-01-05 ENCOUNTER — RX RENEWAL (OUTPATIENT)
Age: 81
End: 2021-01-05

## 2021-04-09 ENCOUNTER — RX RENEWAL (OUTPATIENT)
Age: 81
End: 2021-04-09

## 2021-05-25 ENCOUNTER — APPOINTMENT (OUTPATIENT)
Dept: HEART AND VASCULAR | Facility: CLINIC | Age: 81
End: 2021-05-25
Payer: MEDICARE

## 2021-05-25 VITALS
SYSTOLIC BLOOD PRESSURE: 128 MMHG | OXYGEN SATURATION: 97 % | TEMPERATURE: 97.7 F | DIASTOLIC BLOOD PRESSURE: 70 MMHG | BODY MASS INDEX: 26.46 KG/M2 | HEIGHT: 64 IN | WEIGHT: 155 LBS

## 2021-05-25 PROCEDURE — 94010 BREATHING CAPACITY TEST: CPT | Mod: 59

## 2021-05-25 PROCEDURE — 94729 DIFFUSING CAPACITY: CPT

## 2021-05-25 PROCEDURE — 94727 GAS DIL/WSHOT DETER LNG VOL: CPT

## 2021-05-25 PROCEDURE — 94621 CARDIOPULM EXERCISE TESTING: CPT

## 2021-06-01 ENCOUNTER — APPOINTMENT (OUTPATIENT)
Dept: HEART AND VASCULAR | Facility: CLINIC | Age: 81
End: 2021-06-01
Payer: MEDICARE

## 2021-06-01 DIAGNOSIS — I25.5 ISCHEMIC CARDIOMYOPATHY: ICD-10-CM

## 2021-06-01 PROCEDURE — 99214 OFFICE O/P EST MOD 30 MIN: CPT | Mod: 95

## 2021-06-01 RX ORDER — ACALABRUTINIB 100 MG/1
100 CAPSULE, GELATIN COATED ORAL TWICE DAILY
Refills: 0 | Status: DISCONTINUED | COMMUNITY
End: 2021-06-01

## 2021-06-01 NOTE — REASON FOR VISIT
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

## 2021-06-01 NOTE — HISTORY OF PRESENT ILLNESS
[FreeTextEntry1] : Dhaval Steward requests televideo/telephone follow up.  Consent obtained and recorded.  He is at home and doctor is in [place], [place].\par \par  He denies cp, sob, pnd, orthopnea, edema, palp, or loc.\par \par He is active (picking up his exercise frequency and intensity again).  He is compliant with meds.\par \par CPET 5/2021: ischemic myocardial dysfxn; (dec RER); 89% predicted peak VO2; nl; slight dec in FEV1/VC ratio (mild obstruction)\par \par Clinical hx and results reviewed in detail.\par \par NM from 8/2020 eval.\par \par Recommendations:\par 1. get records and blood work results from [facility] and primary care\par 2. exercise\par 3. dietary counseling\par 4. f/uin 4-5 months\par \par

## 2021-06-01 NOTE — REVIEW OF SYSTEMS
[Cough] : no cough [Wheezing] : wheezing [Coughing Up Blood] : no hemoptysis [Snoring] : no snoring [Negative] : Heme/Lymph

## 2021-06-01 NOTE — DISCUSSION/SUMMARY
[Cardiomyopathy] : cardiomyopathy [Coronary Artery Disease] : coronary artery disease [Anginal Equivalent] : anginal equivalent [Possible Cardiac Ischemia (Intermd Prob)] : possible cardiac ischemia (intermediate probability) [Coronary Artery Catheterization] : coronary artery catheterization [Possible Stent Placement] : possible stent placement [Hyperlipidemia] : hyperlipidemia [Diet Modification] : diet modification [Exercise] : exercise [Hypertension] : hypertension [Stable] : stable [Exercise Regimen] : an exercise regimen [Dietary Modification] : dietary modification [Low Sodium Diet] : low sodium diet [Mild Mitral Regurgitation] : mild mitral regurgitation [Compensated] : compensated [None] : There are no changes in medication management [Sodium Restriction] : sodium restriction [Patient] : the patient [de-identified] : patent LAD stent; 50 LAD mid with 0.84 FFR [FreeTextEntry1] : AI/TR - stable

## 2021-06-01 NOTE — PHYSICAL EXAM
[General Appearance - Well Developed] : well developed [Normal Appearance] : normal appearance [Well Groomed] : well groomed [General Appearance - Well Nourished] : well nourished [No Deformities] : no deformities [General Appearance - In No Acute Distress] : no acute distress [Normal Conjunctiva] : the conjunctiva exhibited no abnormalities [Eyelids - No Xanthelasma] : the eyelids demonstrated no xanthelasmas [Normal Oral Mucosa] : normal oral mucosa [No Oral Pallor] : no oral pallor [No Oral Cyanosis] : no oral cyanosis [Normal Jugular Venous A Waves Present] : normal jugular venous A waves present [Normal Jugular Venous V Waves Present] : normal jugular venous V waves present [No Jugular Venous Jaramillo A Waves] : no jugular venous jaramillo A waves [Respiration, Rhythm And Depth] : normal respiratory rhythm and effort [Exaggerated Use Of Accessory Muscles For Inspiration] : no accessory muscle use [Auscultation Breath Sounds / Voice Sounds] : lungs were clear to auscultation bilaterally [Heart Rate And Rhythm] : heart rate and rhythm were normal [Heart Sounds] : normal S1 and S2 [Murmurs] : no murmurs present [Abdomen Soft] : soft [Abdomen Tenderness] : non-tender [Abdomen Mass (___ Cm)] : no abdominal mass palpated [Abnormal Walk] : normal gait [Gait - Sufficient For Exercise Testing] : the gait was sufficient for exercise testing [Nail Clubbing] : no clubbing of the fingernails [Cyanosis, Localized] : no localized cyanosis [Petechial Hemorrhages (___cm)] : no petechial hemorrhages [Skin Color & Pigmentation] : normal skin color and pigmentation [] : no rash [No Venous Stasis] : no venous stasis [Skin Lesions] : no skin lesions [No Skin Ulcers] : no skin ulcer [No Xanthoma] : no  xanthoma was observed

## 2021-07-08 ENCOUNTER — NON-APPOINTMENT (OUTPATIENT)
Age: 81
End: 2021-07-08

## 2021-10-04 ENCOUNTER — APPOINTMENT (OUTPATIENT)
Dept: HEART AND VASCULAR | Facility: CLINIC | Age: 81
End: 2021-10-04
Payer: MEDICARE

## 2021-10-04 VITALS
OXYGEN SATURATION: 98 % | BODY MASS INDEX: 25.95 KG/M2 | HEIGHT: 64 IN | HEART RATE: 60 BPM | SYSTOLIC BLOOD PRESSURE: 136 MMHG | WEIGHT: 152 LBS | RESPIRATION RATE: 16 BRPM | DIASTOLIC BLOOD PRESSURE: 70 MMHG | TEMPERATURE: 97.2 F

## 2021-10-04 PROCEDURE — 99215 OFFICE O/P EST HI 40 MIN: CPT

## 2021-10-04 RX ORDER — FLUTICASONE PROPIONATE AND SALMETEROL 50; 250 UG/1; UG/1
250-50 POWDER RESPIRATORY (INHALATION)
Refills: 0 | Status: DISCONTINUED | COMMUNITY
End: 2021-10-04

## 2021-10-04 RX ORDER — FLUTICASONE PROPIONATE AND SALMETEROL 250; 50 UG/1; UG/1
250-50 POWDER RESPIRATORY (INHALATION) TWICE DAILY
Refills: 0 | Status: ACTIVE | COMMUNITY

## 2021-10-05 NOTE — PHYSICAL EXAM
[General Appearance - Well Developed] : well developed [Normal Appearance] : normal appearance [Well Groomed] : well groomed [General Appearance - Well Nourished] : well nourished [No Deformities] : no deformities [General Appearance - In No Acute Distress] : no acute distress [Normal Conjunctiva] : the conjunctiva exhibited no abnormalities [Eyelids - No Xanthelasma] : the eyelids demonstrated no xanthelasmas [Normal Oral Mucosa] : normal oral mucosa [No Oral Pallor] : no oral pallor [No Oral Cyanosis] : no oral cyanosis [Normal Jugular Venous A Waves Present] : normal jugular venous A waves present [Normal Jugular Venous V Waves Present] : normal jugular venous V waves present [No Jugular Venous Jaramillo A Waves] : no jugular venous jaramillo A waves [Respiration, Rhythm And Depth] : normal respiratory rhythm and effort [Exaggerated Use Of Accessory Muscles For Inspiration] : no accessory muscle use [Auscultation Breath Sounds / Voice Sounds] : lungs were clear to auscultation bilaterally [Heart Rate And Rhythm] : heart rate and rhythm were normal [Heart Sounds] : normal S1 and S2 [FreeTextEntry1] : sys and rangel murmur [Abdomen Soft] : soft [Abdomen Tenderness] : non-tender [Abdomen Mass (___ Cm)] : no abdominal mass palpated [Abnormal Walk] : normal gait [Gait - Sufficient For Exercise Testing] : the gait was sufficient for exercise testing [Nail Clubbing] : no clubbing of the fingernails [Cyanosis, Localized] : no localized cyanosis [Petechial Hemorrhages (___cm)] : no petechial hemorrhages [Skin Color & Pigmentation] : normal skin color and pigmentation [] : no rash [No Venous Stasis] : no venous stasis [Skin Lesions] : no skin lesions [No Skin Ulcers] : no skin ulcer [No Xanthoma] : no  xanthoma was observed

## 2021-10-05 NOTE — HISTORY OF PRESENT ILLNESS
[FreeTextEntry1] : Dhaval Steward returns for follow up.  \par \par He as undergone primary care eval recently.  He continues to be on acalabrutinib with oversight from [facility] team.\par \par  He denies cp, sob, pnd, orthopnea, edema, palp, or loc.\par \par He is active.  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 get records and blood work results from [facility] and primary care\par 2. exercise\par 3. dietary counseling\par 4. continue CV meds\par 5. CPET\par 6. TTE\par \par

## 2021-10-05 NOTE — DISCUSSION/SUMMARY
[Cardiomyopathy] : cardiomyopathy [Coronary Artery Disease] : coronary artery disease [Hyperlipidemia] : hyperlipidemia [Diet Modification] : diet modification [Exercise] : exercise [Hypertension] : hypertension [Stable] : stable [Exercise Regimen] : an exercise regimen [Dietary Modification] : dietary modification [Low Sodium Diet] : low sodium diet [Mild Mitral Regurgitation] : mild mitral regurgitation [Compensated] : compensated [None] : There are no changes in medication management [Sodium Restriction] : sodium restriction [Patient] : the patient [FreeTextEntry1] : AI/TR - stable

## 2021-10-06 ENCOUNTER — RX RENEWAL (OUTPATIENT)
Age: 81
End: 2021-10-06

## 2021-12-21 PROBLEM — Z20.822 EXPOSURE TO COVID-19 VIRUS: Status: ACTIVE | Noted: 2021-01-01

## 2022-01-01 ENCOUNTER — APPOINTMENT (OUTPATIENT)
Dept: HEART AND VASCULAR | Facility: CLINIC | Age: 82
End: 2022-01-01
Payer: MEDICARE

## 2022-01-01 ENCOUNTER — APPOINTMENT (OUTPATIENT)
Dept: HEART AND VASCULAR | Facility: CLINIC | Age: 82
End: 2022-01-01

## 2022-01-01 ENCOUNTER — NON-APPOINTMENT (OUTPATIENT)
Age: 82
End: 2022-01-01

## 2022-01-01 ENCOUNTER — RX RENEWAL (OUTPATIENT)
Age: 82
End: 2022-01-01

## 2022-01-01 VITALS
OXYGEN SATURATION: 97 % | SYSTOLIC BLOOD PRESSURE: 130 MMHG | HEIGHT: 64 IN | BODY MASS INDEX: 24.07 KG/M2 | RESPIRATION RATE: 16 BRPM | HEART RATE: 77 BPM | WEIGHT: 141 LBS | DIASTOLIC BLOOD PRESSURE: 56 MMHG | TEMPERATURE: 97 F

## 2022-01-01 VITALS
TEMPERATURE: 97.9 F | SYSTOLIC BLOOD PRESSURE: 160 MMHG | WEIGHT: 155 LBS | HEIGHT: 64 IN | HEART RATE: 72 BPM | OXYGEN SATURATION: 97 % | RESPIRATION RATE: 12 BRPM | DIASTOLIC BLOOD PRESSURE: 72 MMHG | BODY MASS INDEX: 26.46 KG/M2

## 2022-01-01 DIAGNOSIS — I10 ESSENTIAL (PRIMARY) HYPERTENSION: ICD-10-CM

## 2022-01-01 DIAGNOSIS — Z85.79 PERSONAL HISTORY OF OTHER MALIGNANT NEOPLASMS OF LYMPHOID, HEMATOPOIETIC AND RELATED TISSUES: ICD-10-CM

## 2022-01-01 DIAGNOSIS — I34.0 NONRHEUMATIC MITRAL (VALVE) INSUFFICIENCY: ICD-10-CM

## 2022-01-01 DIAGNOSIS — I07.1 RHEUMATIC TRICUSPID INSUFFICIENCY: ICD-10-CM

## 2022-01-01 DIAGNOSIS — I35.1 NONRHEUMATIC AORTIC (VALVE) INSUFFICIENCY: ICD-10-CM

## 2022-01-01 DIAGNOSIS — I51.89 OTHER ILL-DEFINED HEART DISEASES: ICD-10-CM

## 2022-01-01 DIAGNOSIS — E78.5 HYPERLIPIDEMIA, UNSPECIFIED: ICD-10-CM

## 2022-01-01 DIAGNOSIS — D49.0 NEOPLASM OF UNSPECIFIED BEHAVIOR OF DIGESTIVE SYSTEM: ICD-10-CM

## 2022-01-01 DIAGNOSIS — I51.7 CARDIOMEGALY: ICD-10-CM

## 2022-01-01 DIAGNOSIS — I25.10 ATHEROSCLEROTIC HEART DISEASE OF NATIVE CORONARY ARTERY W/OUT ANGINA PECTORIS: ICD-10-CM

## 2022-01-01 DIAGNOSIS — I45.10 UNSPECIFIED RIGHT BUNDLE-BRANCH BLOCK: ICD-10-CM

## 2022-01-01 PROCEDURE — 93306 TTE W/DOPPLER COMPLETE: CPT

## 2022-01-01 PROCEDURE — 99215 OFFICE O/P EST HI 40 MIN: CPT

## 2022-01-01 PROCEDURE — 93000 ELECTROCARDIOGRAM COMPLETE: CPT

## 2022-01-01 RX ORDER — PANCRELIPASE 120000; 24000; 76000 [USP'U]/1; [USP'U]/1; [USP'U]/1
24000-76000 CAPSULE, DELAYED RELEASE PELLETS ORAL
Qty: 52 | Refills: 0 | Status: DISCONTINUED | COMMUNITY
Start: 2022-01-01

## 2022-01-01 RX ORDER — ACALABRUTINIB 100 MG/1
CAPSULE, GELATIN COATED ORAL
Refills: 0 | Status: ACTIVE | COMMUNITY

## 2022-01-01 RX ORDER — BIMATOPROST 0.1 MG/ML
0.01 SOLUTION/ DROPS OPHTHALMIC
Qty: 2 | Refills: 0 | Status: DISCONTINUED | COMMUNITY
Start: 2022-01-01

## 2022-01-01 RX ORDER — AMLODIPINE BESYLATE 5 MG/1
5 TABLET ORAL
Qty: 90 | Refills: 3 | Status: ACTIVE | COMMUNITY
Start: 2021-07-08 | End: 1900-01-01

## 2022-02-24 PROBLEM — I07.1 MILD TRICUSPID INSUFFICIENCY: Status: ACTIVE | Noted: 2017-07-25

## 2022-02-24 PROBLEM — Z85.79 HISTORY OF WALDENSTROM'S MACROGLOBULINEMIA: Status: RESOLVED | Noted: 2022-01-01 | Resolved: 2022-01-01

## 2022-02-24 NOTE — HISTORY OF PRESENT ILLNESS
[FreeTextEntry1] : Dhaval Steward returns for follow up.  \par \par As part of his routine follow up for Waldenstrom's macroglobulinemia in October 2021, he did undergo CT of chest and abdomen.  A pancreatic cyst was noted (other findings at baseline).  MRI of the abdomen was done in late October 2021 better characterizing the pancreatic lesion.  EUS was performed and report noted.  He is undergoing eval by GI surgery to determine best approach to manage the findings.\par \par He continues to be on acalabrutinib with oversight from [facility] team.\par \par  He denies cp, sob, pnd, orthopnea, edema, palp, or loc.\par \par He is active.  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 continue CV meds\par 2. exercise\par 3. dietary counseling\par 4. continue CV meds\par 5. ok to proceed with surgical eval
- - -

## 2022-02-24 NOTE — DISCUSSION/SUMMARY
[Bundle Branch Block] : ~T bundle branch block [Cardiomyopathy] : cardiomyopathy [Coronary Artery Disease] : coronary artery disease [Hyperlipidemia] : hyperlipidemia [Diet Modification] : diet modification [Exercise] : exercise [Hypertension] : hypertension [Stable] : stable [Exercise Regimen] : an exercise regimen [Dietary Modification] : dietary modification [Low Sodium Diet] : low sodium diet [Mild Mitral Regurgitation] : mild mitral regurgitation [Compensated] : compensated [None] : There are no changes in medication management [Sodium Restriction] : sodium restriction [Patient] : the patient [FreeTextEntry1] : AI/TR - stable

## 2022-02-24 NOTE — REASON FOR VISIT
[Arrhythmia/ECG Abnorrmalities] : arrhythmia/ECG abnormalities [Structural Heart and Valve Disease] : structural heart and valve disease [Follow-Up - Clinic] : a clinic follow-up of [FreeTextEntry3] : [facility] [Cardiomyopathy] : cardiomyopathy [Coronary Artery Disease] : coronary artery disease [Hyperlipidemia] : hyperlipidemia [Hypertension] : hypertension [Mitral Regurgitation] : mitral regurgitation [FreeTextEntry1] : TR/AI

## 2022-07-09 PROBLEM — D49.0: Status: RESOLVED | Noted: 2022-01-01 | Resolved: 2022-01-01

## 2022-07-09 PROBLEM — I35.1 NONRHEUMATIC AORTIC VALVE INSUFFICIENCY: Status: ACTIVE | Noted: 2017-07-25

## 2022-07-09 PROBLEM — I45.10 RIGHT BUNDLE BRANCH BLOCK (RBBB): Status: ACTIVE | Noted: 2022-01-01

## 2022-07-09 PROBLEM — I51.7 LEFT VENTRICULAR HYPERTROPHY: Status: ACTIVE | Noted: 2022-01-01

## 2022-07-09 NOTE — DISCUSSION/SUMMARY
[Bundle Branch Block] : ~T bundle branch block [Cardiomyopathy] : cardiomyopathy [Coronary Artery Disease] : coronary artery disease [Hyperlipidemia] : hyperlipidemia [Diet Modification] : diet modification [Exercise] : exercise [Hypertension] : hypertension [Stable] : stable [Exercise Regimen] : an exercise regimen [Dietary Modification] : dietary modification [Low Sodium Diet] : low sodium diet [Mild Mitral Regurgitation] : mild mitral regurgitation [Compensated] : compensated [None] : There are no changes in medication management [Sodium Restriction] : sodium restriction [Patient] : the patient [de-identified] : LVH [FreeTextEntry1] : AI - mild \par \par TR - trivial

## 2022-07-09 NOTE — REVIEW OF SYSTEMS
[Cough] : no cough [Wheezing] : no wheezing [Coughing Up Blood] : no hemoptysis [Snoring] : no snoring [Negative] : Heme/Lymph

## 2022-07-09 NOTE — REASON FOR VISIT
[Arrhythmia/ECG Abnorrmalities] : arrhythmia/ECG abnormalities [Structural Heart and Valve Disease] : structural heart and valve disease [Hyperlipidemia] : hyperlipidemia [Hypertension] : hypertension [Coronary Artery Disease] : coronary artery disease [FreeTextEntry3] : Emerson Hanson

## 2022-07-09 NOTE — HISTORY OF PRESENT ILLNESS
[FreeTextEntry1] : Dhaval Steward returns for follow up.  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  He is recovering gradually.  \par \par He denies cp, pnd, orthopnea, edema, palp, or loc.  He has fatigue and VINCENT.  \par \par He is increasing his activity gradually.  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 continue CV meds\par 2. exercise\par 3. dietary counseling\par 4. forward notes and results to primary care\par 5. f/u in 4 months

## 2022-07-09 NOTE — PHYSICAL EXAM
[General Appearance - Well Developed] : well developed [Normal Appearance] : normal appearance [Well Groomed] : well groomed [General Appearance - Well Nourished] : well nourished [No Deformities] : no deformities [General Appearance - In No Acute Distress] : no acute distress [Eyelids - No Xanthelasma] : the eyelids demonstrated no xanthelasmas [Normal Conjunctiva] : the conjunctiva exhibited no abnormalities [Normal Oral Mucosa] : normal oral mucosa [No Oral Pallor] : no oral pallor [No Oral Cyanosis] : no oral cyanosis [Normal Jugular Venous A Waves Present] : normal jugular venous A waves present [Normal Jugular Venous V Waves Present] : normal jugular venous V waves present [No Jugular Venous Jaramillo A Waves] : no jugular venous jaramillo A waves [Exaggerated Use Of Accessory Muscles For Inspiration] : no accessory muscle use [Respiration, Rhythm And Depth] : normal respiratory rhythm and effort [Auscultation Breath Sounds / Voice Sounds] : lungs were clear to auscultation bilaterally [Heart Rate And Rhythm] : heart rate and rhythm were normal [Heart Sounds] : normal S1 and S2 [FreeTextEntry1] : sys and rangel murmur [Abdomen Soft] : soft [Abdomen Tenderness] : non-tender [Abdomen Mass (___ Cm)] : no abdominal mass palpated [Abnormal Walk] : normal gait [Gait - Sufficient For Exercise Testing] : the gait was sufficient for exercise testing [Nail Clubbing] : no clubbing of the fingernails [Cyanosis, Localized] : no localized cyanosis [Petechial Hemorrhages (___cm)] : no petechial hemorrhages [Skin Color & Pigmentation] : normal skin color and pigmentation [] : no rash [No Venous Stasis] : no venous stasis [No Skin Ulcers] : no skin ulcer [Skin Lesions] : no skin lesions [No Xanthoma] : no  xanthoma was observed

## 2022-11-10 ENCOUNTER — APPOINTMENT (OUTPATIENT)
Dept: HEART AND VASCULAR | Facility: CLINIC | Age: 82
End: 2022-11-10

## 2023-06-01 NOTE — HISTORY OF PRESENT ILLNESS
Called pt and let pt know ready for . Thanked for call. KK 6/1/23   [FreeTextEntry1] : Dhaval Steward returns for follow up.  He denies cp, sob, pnd, orthopnea, edema, palp, or loc.\par \par He is active.  He is compliant with meds.\par \par CPET 11/2019: (dec RER); 102% predicted peak VO2; nl; slight dec in FEV1/VC ratio\par \par He is tolerating amlodipine much better than ramipril.\par \par Recommendations:\par 1. get blood work results from [facility]\par 2. CPET\par 3. consider additional blood work\par 4. exercise\par \par

## 2024-03-18 NOTE — DISCUSSION/SUMMARY
[Cardiomyopathy] : cardiomyopathy [Coronary Artery Disease] : coronary artery disease [Anginal Equivalent] : anginal equivalent [Possible Cardiac Ischemia (Intermd Prob)] : possible cardiac ischemia (intermediate probability) [Medication Changes Per Orders] : as documented in orders [Coronary Artery Catheterization] : coronary artery catheterization [Possible Stent Placement] : possible stent placement [Dietary Modification] : dietary modification [Hyperlipidemia] : hyperlipidemia [Diet Modification] : diet modification [Exercise] : exercise [Hypertension] : hypertension [Stable] : stable [Exercise Regimen] : an exercise regimen [Mild Mitral Regurgitation] : mild mitral regurgitation [Compensated] : compensated No [None] : none [Sodium Restriction] : sodium restriction [Patient] : the patient [de-identified] : patent LAD stent; 50 LAD mid with 0.84 FFR [FreeTextEntry1] : TR/AI - stable